# Patient Record
Sex: FEMALE | URBAN - METROPOLITAN AREA
[De-identification: names, ages, dates, MRNs, and addresses within clinical notes are randomized per-mention and may not be internally consistent; named-entity substitution may affect disease eponyms.]

---

## 2017-03-26 ENCOUNTER — EMERGENCY (EMERGENCY)
Facility: HOSPITAL | Age: 32
LOS: 1 days | Discharge: HOME | End: 2017-03-26
Attending: EMERGENCY MEDICINE | Admitting: EMERGENCY MEDICINE
Payer: COMMERCIAL

## 2017-03-26 VITALS
DIASTOLIC BLOOD PRESSURE: 86 MMHG | HEIGHT: 64 IN | HEART RATE: 78 BPM | TEMPERATURE: 98 F | SYSTOLIC BLOOD PRESSURE: 132 MMHG | RESPIRATION RATE: 20 BRPM | OXYGEN SATURATION: 100 %

## 2017-03-26 VITALS
SYSTOLIC BLOOD PRESSURE: 129 MMHG | OXYGEN SATURATION: 99 % | HEART RATE: 71 BPM | DIASTOLIC BLOOD PRESSURE: 85 MMHG | RESPIRATION RATE: 16 BRPM | TEMPERATURE: 98 F

## 2017-03-26 DIAGNOSIS — L50.9 URTICARIA, UNSPECIFIED: ICD-10-CM

## 2017-03-26 DIAGNOSIS — J45.909 UNSPECIFIED ASTHMA, UNCOMPLICATED: ICD-10-CM

## 2017-03-26 DIAGNOSIS — R21 RASH AND OTHER NONSPECIFIC SKIN ERUPTION: ICD-10-CM

## 2017-03-26 PROCEDURE — 99283 EMERGENCY DEPT VISIT LOW MDM: CPT | Mod: 25

## 2017-03-26 PROCEDURE — 99283 EMERGENCY DEPT VISIT LOW MDM: CPT

## 2017-03-26 RX ORDER — FAMOTIDINE 10 MG/ML
40 INJECTION INTRAVENOUS ONCE
Qty: 0 | Refills: 0 | Status: COMPLETED | OUTPATIENT
Start: 2017-03-26 | End: 2017-03-26

## 2017-03-26 RX ORDER — DIPHENHYDRAMINE HCL 50 MG
50 CAPSULE ORAL ONCE
Qty: 0 | Refills: 0 | Status: COMPLETED | OUTPATIENT
Start: 2017-03-26 | End: 2017-03-26

## 2017-03-26 RX ADMIN — Medication 50 MILLIGRAM(S): at 04:42

## 2017-03-26 RX ADMIN — Medication 60 MILLIGRAM(S): at 05:42

## 2017-03-26 RX ADMIN — FAMOTIDINE 40 MILLIGRAM(S): 10 INJECTION INTRAVENOUS at 04:42

## 2017-03-26 NOTE — ED PROVIDER NOTE - OBJECTIVE STATEMENT
Carrie Servin M.D: 31yoF no PMH p/w diffuse hives x2days. has been taking benadryl without significant improvement of symptoms. denies any difficulty breathing, wheezing Carrie Servin M.D: 31yoF no PMH p/w diffuse hives x2days. has been taking benadryl without significant improvement of symptoms. denies any difficulty breathing, wheezing, N/V, tongue swelling, lip swelling. no new soaps, detergents, clothes, towels etc. never had symptoms like this before. no known allergies.

## 2017-03-26 NOTE — ED ADULT NURSE NOTE - OBJECTIVE STATEMENT
31 year old female presented to the ED complaining of diffuse hives and itching for 2 days. Pt denies swelling of tongue or difficulty breathing. As per patient she began using the nuva ring 1 week ago. Pt states that she's been taking 25mg benadryl at home without much improvement. VSS, afebrile pt ambulates independently, will continue to monitor lung sounds clear. Pt shows no signs of respiratory distress. pt resting comfortably, A&O  x4. Pt denies using of any new products or medications in the last 2 days.

## 2017-03-26 NOTE — ED ADULT NURSE NOTE - CHPI ED SYMPTOMS NEG
no difficulty breathing/no cough/no nausea/no vomiting/no shortness of breath/no throat itching/no difficulty swallowing/no wheezing/no swelling of face, tongue

## 2017-03-26 NOTE — ED PROVIDER NOTE - PHYSICAL EXAMINATION
Carrie Servin M.D.: patient awake alert seen lying on stretcher NAD . LUNGS CTAB no wheeze no crackle. CARD RRR no m/r/g.  Abdomen soft NT ND no rebound no guarding no CVA tenderness. EXT WWP no edema no calf tenderness CV 2+DP/PT bilaterally. neuro A&Ox3 gait normal.  skin warm and dry. diffuse hives spread throughout arms, legs. no lip or tongue swelling.

## 2017-03-26 NOTE — ED PROVIDER NOTE - ATTENDING CONTRIBUTION TO CARE
Agree with resident history  On exam, CTA Bl, no angio edema, no stridor, OP clear, s1, s2, rr soft nt nd no r/g/r, urticarial rash on arms and legs with no sloughing, ttp, + blanching not involving palms or soles

## 2017-03-26 NOTE — ED PROVIDER NOTE - MEDICAL DECISION MAKING DETAILS
Carrie Servin M.D: 31yoF w/ diffuse hives w/o any other s/s concerning for anaphylaxis. symptomatic treatment and steroid taper. d/c.

## 2017-07-29 ENCOUNTER — TRANSCRIPTION ENCOUNTER (OUTPATIENT)
Age: 32
End: 2017-07-29

## 2019-05-10 ENCOUNTER — TRANSCRIPTION ENCOUNTER (OUTPATIENT)
Age: 34
End: 2019-05-10

## 2024-11-27 ENCOUNTER — NEW PROBLEM (OUTPATIENT)
Dept: URBAN - METROPOLITAN AREA CLINIC 68 | Facility: CLINIC | Age: 39
End: 2024-11-27

## 2024-11-27 DIAGNOSIS — H02.882: ICD-10-CM

## 2024-11-27 DIAGNOSIS — H01.021: ICD-10-CM

## 2024-11-27 DIAGNOSIS — H00.11: ICD-10-CM

## 2024-11-27 DIAGNOSIS — H02.885: ICD-10-CM

## 2024-11-27 DIAGNOSIS — H01.024: ICD-10-CM

## 2024-11-27 PROCEDURE — 92002 INTRM OPH EXAM NEW PATIENT: CPT

## 2024-11-27 ASSESSMENT — TONOMETRY
OS_IOP_MMHG: 17
OD_IOP_MMHG: 15

## 2024-11-27 ASSESSMENT — VISUAL ACUITY
OD_CC: 20/20-1
OS_CC: 20/20

## 2024-12-09 ENCOUNTER — EMERGENCY VISIT (OUTPATIENT)
Dept: URBAN - METROPOLITAN AREA CLINIC 68 | Facility: CLINIC | Age: 39
End: 2024-12-09

## 2024-12-09 DIAGNOSIS — H18.831: ICD-10-CM

## 2024-12-09 PROCEDURE — 92012 INTRM OPH EXAM EST PATIENT: CPT

## 2024-12-09 ASSESSMENT — VISUAL ACUITY
OS_CC: 20/25+2
OD_CC: 20/20-1

## 2024-12-09 ASSESSMENT — TONOMETRY
OD_IOP_MMHG: 14
OS_IOP_MMHG: 15

## 2024-12-16 ENCOUNTER — FOLLOW UP (OUTPATIENT)
Dept: URBAN - METROPOLITAN AREA CLINIC 68 | Facility: CLINIC | Age: 39
End: 2024-12-16

## 2024-12-16 DIAGNOSIS — H18.831: ICD-10-CM

## 2024-12-16 DIAGNOSIS — H00.11: ICD-10-CM

## 2024-12-16 PROCEDURE — 99214 OFFICE O/P EST MOD 30 MIN: CPT

## 2024-12-16 ASSESSMENT — TONOMETRY
OD_IOP_MMHG: 16
OS_IOP_MMHG: 17

## 2024-12-16 ASSESSMENT — VISUAL ACUITY
OS_CC: 20/25+1
OD_CC: 20/20

## 2024-12-20 ENCOUNTER — PROCEDURE ONLY (OUTPATIENT)
Dept: URBAN - METROPOLITAN AREA CLINIC 38 | Facility: CLINIC | Age: 39
End: 2024-12-20

## 2024-12-20 DIAGNOSIS — H00.11: ICD-10-CM

## 2024-12-20 PROCEDURE — 99214 OFFICE O/P EST MOD 30 MIN: CPT | Mod: NC

## 2024-12-20 ASSESSMENT — VISUAL ACUITY
OD_CC: 20/20
OS_CC: 20/20

## 2024-12-20 ASSESSMENT — TONOMETRY
OS_IOP_MMHG: 18
OD_IOP_MMHG: 14

## (undated) RX ORDER — NEOMYCIN SULFATE, POLYMYXIN B SULFATE AND DEXAMETHASONE 3.5; 10000; 1 MG/G; [USP'U]/G; MG/G
1/4 OINTMENT OPHTHALMIC TWICE A DAY
End: 2025-01-02